# Patient Record
Sex: MALE | Race: WHITE | Employment: OTHER | URBAN - METROPOLITAN AREA
[De-identification: names, ages, dates, MRNs, and addresses within clinical notes are randomized per-mention and may not be internally consistent; named-entity substitution may affect disease eponyms.]

---

## 2021-03-30 ENCOUNTER — HOSPITAL ENCOUNTER (OUTPATIENT)
Age: 66
Setting detail: OUTPATIENT SURGERY
Discharge: HOME | End: 2021-03-30
Attending: UROLOGY | Admitting: UROLOGY
Payer: MEDICAID

## 2021-03-30 VITALS
OXYGEN SATURATION: 100 % | SYSTOLIC BLOOD PRESSURE: 164 MMHG | WEIGHT: 259 LBS | RESPIRATION RATE: 18 BRPM | HEART RATE: 60 BPM | HEIGHT: 73 IN | BODY MASS INDEX: 34.33 KG/M2 | TEMPERATURE: 98 F | DIASTOLIC BLOOD PRESSURE: 94 MMHG

## 2021-03-30 PROCEDURE — 6370000000 HC RX 637 (ALT 250 FOR IP): Performed by: UROLOGY

## 2021-03-30 PROCEDURE — 2709999900 HC NON-CHARGEABLE SUPPLY: Performed by: UROLOGY

## 2021-03-30 PROCEDURE — 7100000010 HC PHASE II RECOVERY - FIRST 15 MIN: Performed by: UROLOGY

## 2021-03-30 PROCEDURE — 3600000012 HC SURGERY LEVEL 2 ADDTL 15MIN: Performed by: UROLOGY

## 2021-03-30 PROCEDURE — 3600000002 HC SURGERY LEVEL 2 BASE: Performed by: UROLOGY

## 2021-03-30 RX ORDER — METOPROLOL SUCCINATE 100 MG/1
1 TABLET, EXTENDED RELEASE ORAL DAILY
COMMUNITY
Start: 2021-03-10

## 2021-03-30 RX ORDER — TAMSULOSIN HYDROCHLORIDE 0.4 MG/1
2 CAPSULE ORAL NIGHTLY
COMMUNITY
Start: 2021-03-10

## 2021-03-30 RX ORDER — LEVOFLOXACIN 500 MG/1
500 TABLET, FILM COATED ORAL ONCE
Status: COMPLETED | OUTPATIENT
Start: 2021-03-30 | End: 2021-03-30

## 2021-03-30 RX ORDER — WARFARIN SODIUM 6 MG/1
9 TABLET ORAL
Status: ON HOLD | COMMUNITY
End: 2021-05-05 | Stop reason: HOSPADM

## 2021-03-30 RX ORDER — LEVOFLOXACIN 500 MG/1
500 TABLET, FILM COATED ORAL ONCE
Qty: 1 TABLET | Refills: 0 | Status: SHIPPED | OUTPATIENT
Start: 2021-03-30 | End: 2021-03-30

## 2021-03-30 RX ORDER — LISINOPRIL 40 MG/1
20 TABLET ORAL 2 TIMES DAILY
COMMUNITY
Start: 2021-03-12

## 2021-03-30 RX ORDER — LIDOCAINE HYDROCHLORIDE 20 MG/ML
JELLY TOPICAL PRN
Status: DISCONTINUED | OUTPATIENT
Start: 2021-03-30 | End: 2021-03-30 | Stop reason: ALTCHOICE

## 2021-03-30 RX ADMIN — LEVOFLOXACIN 500 MG: 500 TABLET, FILM COATED ORAL at 15:41

## 2021-03-30 ASSESSMENT — PAIN - FUNCTIONAL ASSESSMENT: PAIN_FUNCTIONAL_ASSESSMENT: 0-10

## 2021-03-30 NOTE — H&P
History and Physical    Patient:  Lynsey Renee  MRN: [id number]    CHIEF COMPLAINT:  BPH    HISTORY OF PRESENT ILLNESS:   The patient is a 63 y.o. male who presents with BPH. Patient's old records, notes and chart reviewed and summarized above. Past Medical History:    Past Medical History:   Diagnosis Date    BPH (benign prostatic hyperplasia)     Hypertension     Mitral valve prolapse     TIA (transient ischemic attack)        Past Surgical History:    Past Surgical History:   Procedure Laterality Date    CARDIAC SURGERY      INSERTABLE CARDIAC MONITOR      MITRAL VALVE REPAIR      minimally invasive procedure    PROSTATE BIOPSY      X2, found tiny spot of cancer on biopsies    VASECTOMY         Medications Prior to Admission:    Prior to Admission medications    Medication Sig Start Date End Date Taking? Authorizing Provider   warfarin (COUMADIN) 6 MG tablet Take 9 mg by mouth three times a week Takes (1.5) 6 mg tablets = 9 mg  On Tuesday, Thursday, Saturday   Yes Historical Provider, MD   metoprolol succinate (TOPROL XL) 100 MG extended release tablet Take 1 tablet by mouth daily 3/10/21  Yes Historical Provider, MD   lisinopril (PRINIVIL;ZESTRIL) 40 MG tablet Take 20 mg by mouth 2 times daily Takes 1/2 of a 40 mg tablet 3/12/21  Yes Historical Provider, MD   tamsulosin (FLOMAX) 0.4 MG capsule Take 2 capsules by mouth nightly 3/10/21  Yes Historical Provider, MD   SPIRONOLACTONE PO Take 1 tablet by mouth daily   Yes Historical Provider, MD   warfarin (COUMADIN) 6 MG tablet Take 6 mg by mouth four times a week On Sunday, Monday, Wednesday, Friday   Yes Historical Provider, MD       Allergies:  Patient has no known allergies.     Social History:    Social History     Socioeconomic History    Marital status:      Spouse name: Not on file    Number of children: Not on file    Years of education: Not on file    Highest education level: Not on file   Occupational History    Not on file Social Needs    Financial resource strain: Not on file    Food insecurity     Worry: Not on file     Inability: Not on file    Transportation needs     Medical: Not on file     Non-medical: Not on file   Tobacco Use    Smoking status: Current Some Day Smoker     Types: Cigars    Smokeless tobacco: Never Used   Substance and Sexual Activity    Alcohol use: Yes     Comment: occasional beer    Drug use: No    Sexual activity: Not on file   Lifestyle    Physical activity     Days per week: Not on file     Minutes per session: Not on file    Stress: Not on file   Relationships    Social connections     Talks on phone: Not on file     Gets together: Not on file     Attends Anglican service: Not on file     Active member of club or organization: Not on file     Attends meetings of clubs or organizations: Not on file     Relationship status: Not on file    Intimate partner violence     Fear of current or ex partner: Not on file     Emotionally abused: Not on file     Physically abused: Not on file     Forced sexual activity: Not on file   Other Topics Concern    Not on file   Social History Narrative    Not on file       Family History:  History reviewed. No pertinent family history. REVIEW OF SYSTEMS:  All systems reviewed and negative except for that already noted in the HPI. Physical Exam:      Patient Vitals for the past 24 hrs:   BP Temp Temp src Pulse Resp SpO2 Height Weight   03/30/21 1401 (!) 151/84 96.6 °F (35.9 °C) Skin 72 20 97 % 6' 1\" (1.854 m) 259 lb (117.5 kg)     Constitutional: Patient in no acute distress; Neuro: alert and oriented to person place and time. Psych: Mood and affect normal.  Skin: Normal  Lungs: Respiratory effort normal  Cardiovascular:  Normal peripheral pulses  Abdomen: Soft, non-tender, non-distended with no CVA, flank pain, hepatosplenomegaly or hernia. Kidneys normal.  Bladder non-tender and not distended.   Lymphatics: no palpable lymphadenopathy  Penis normal and circumcised  Urethral meatus normal  Scrotal exam normal  Testicles normal bilaterally  Epididymis normal bilaterally  No evidence of inguinal hernia  Anus and perineum normal  Normal rectal tone with no masses  Prostate soft, non-tender to palpation. No palpable nodules. Estimated 40 grams. Seminal vesicles not palpable. LABS:   No results for input(s): WBC, HGB, HCT, MCV, PLT in the last 72 hours. No results for input(s): NA, K, CL, CO2, PHOS, BUN, CREATININE in the last 72 hours. Invalid input(s): CA  No results found for: PSA    Additional Lab/culture results:    Urinalysis: No results for input(s): COLORU, PHUR, LABCAST, WBCUA, RBCUA, MUCUS, TRICHOMONAS, YEAST, BACTERIA, CLARITYU, SPECGRAV, LEUKOCYTESUR, UROBILINOGEN, [place] in the last 72 hours. Invalid input(s): NITRATE, GLUCOSEUKETONESUAMORPHOUS     -----------------------------------------------------------------  Imaging Results:    Assessment and Plan   Impression:  There is no problem list on file for this patient. Plan: Cystoscopy/uroflow to evaluate for UroLift.     Kenney Salinas  3:05 PM 3/30/2021

## 2021-03-30 NOTE — H&P
History and Physical Update    Pt Name: Marcelo Vences  MRN: [id number]  YOB: 1955  Date of evaluation: 3/30/2021      [x] I have reviewed the hardcopy Progress Note by Dr Renee Orozco dated 3/3/21 labeled in short chart which meets the criteria for an Interval History and Physical note. [x] I have examined  Thomas Pym  There are no changes to the patient who is scheduled for a Local cystoscopy with uroflow by Dr Renee Orozco for BPH with obstruction    The patient denies new health changes, fever, chills, wheezing, cough, increased SOB, chest pain, open sores or wounds. Continued Coumadin as directed     Past Medical History:     Past Medical History:   Diagnosis Date    BPH (benign prostatic hyperplasia)     Hypertension     Mitral valve prolapse     TIA (transient ischemic attack)         Past Surgical History:     Past Surgical History:   Procedure Laterality Date    CARDIAC SURGERY      INSERTABLE CARDIAC MONITOR      MITRAL VALVE REPAIR      minimally invasive procedure    PROSTATE BIOPSY      X2, found tiny spot of cancer on biopsies    VASECTOMY          Social History:     Tobacco:    reports that he has been smoking cigars. He has never used smokeless tobacco.  Alcohol:      reports current alcohol use. Drug Use:  reports no history of drug use. Family History:     History reviewed. No pertinent family history. Medication History:     Prior to Admission medications    Medication Sig Start Date End Date Taking?  Authorizing Provider   warfarin (COUMADIN) 6 MG tablet Take 9 mg by mouth three times a week Takes (1.5) 6 mg tablets = 9 mg  On Tuesday, Thursday, Saturday   Yes Historical Provider, MD   metoprolol succinate (TOPROL XL) 100 MG extended release tablet Take 1 tablet by mouth daily 3/10/21  Yes Historical Provider, MD   lisinopril (PRINIVIL;ZESTRIL) 40 MG tablet Take 20 mg by mouth 2 times daily Takes 1/2 of a 40 mg tablet 3/12/21  Yes Historical Provider, MD   tamsulosin [facility]) 0.4 MG capsule Take 2 capsules by mouth nightly 3/10/21  Yes Historical Provider, MD   SPIRONOLACTONE PO Take 1 tablet by mouth daily   Yes Historical Provider, MD   warfarin (COUMADIN) 6 MG tablet Take 6 mg by mouth four times a week On Sunday, Monday, Wednesday, Friday   Yes Historical Provider, MD         Vital signs: BP (!) 151/84   Pulse 72   Temp 96.6 °F (35.9 °C) (Skin)   Resp 20   Ht 6' 1\" (1.854 m)   Wt 259 lb (117.5 kg)   SpO2 97%   BMI 34.17 kg/m²     Allergies:  Patient has no known allergies. This is a 63 y.o. male who is pleasant, cooperative, alert and oriented x3, in no acute distress. Heart: Heart sounds are normal.  HR 72 regular rate and rhythm without murmur, gallop or rub. Lungs: Normal respiratory effort with equal expansion, good air exchange, unlabored and clear to auscultation without wheezes or rales bilaterally   No CVA tenderness. Abdomen: soft, nontender, nondistended with bowel sounds . Labs:  No results for input(s): HGB, HCT, WBC, MCV, PLT, NA, K, CL, CO2, BUN, CREATININE, GLUCOSE, INR, PROTIME, APTT, AST, ALT, LABALBU, HCG in the last 720 hours. No results for input(s): COVID19 in the last 720 hours.     Sharyle Britain APRN, ANP-BC  Electronically signed 3/30/2021 at 3:12 PM

## 2021-03-31 NOTE — OP NOTE
[id number] [facility] [address]                [address] [place] [address]                                OPERATIVE REPORT    PATIENT NAME: JAKE SAUER                        :        1955  MED REC NO:   [id number]                             ROOM:  ACCOUNT NO:   [de-identified]                           ADMIT DATE: 2021  PROVIDER:     Tasia Drummond    DATE OF PROCEDURE:  2021    PREOPERATIVE DIAGNOSIS:  BPH with obstruction. POSTOPERATIVE DIAGNOSIS:  BPH with obstruction. PROCEDURES PERFORMED:  1. Cystoscopy. 2.  Uroflow. SURGEON:  Adan Hunt MD    ESTIMATED BLOOD LOSS:  None. ANESTHESIA:  Local.    INDICATIONS:  The patient is a 62-year-old male. He has BPH with  obstruction. He also has prostate cancer under active surveillance. He  takes 2 capsules of Flomax daily. He still has obstructive symptoms. His prostate volume is approximately 81 g. He presents for the above  procedure. PROCEDURE IN DETAIL:  He was taken to the operating room. He did a  uroflow study. He voided 167.2 mL. The max flow rate was only 5.5  mL/sec. He was put in the supine position. His genitals were prepped  in usual sterile fashion. Lidocaine jelly 2% was injected into the  urethra. 14-Bengali catheter was placed with 125 mL of residual urine  noted. Next, flexible cystoscopy was performed. Findings revealed no  strictures. The prostatic urethra was answered, which showed a 3.5 cm  prostate with visual occlusion. He did not have any median lobe  protrusion. The bladder itself had mild trabeculations. No bladder  cancer was noted. The scope was retroflexed with no median lobe  protrusion noted. He tolerated it well. Plan will be to schedule him  for the UroLift procedure. We will get clearance from Cardiology to  hold his Coumadin. All of his questions were answered.         Kim Orozco    D: 2021 15:51:45       T: 03/30/2021 15:57:56     [email or web address]  Job#: [id number]     Doc#: [id number]    CC:

## 2021-05-01 ENCOUNTER — HOSPITAL ENCOUNTER (OUTPATIENT)
Dept: LAB | Age: 66
Setting detail: SPECIMEN
Discharge: HOME | End: 2021-05-01
Payer: MEDICAID

## 2021-05-01 DIAGNOSIS — Z01.818 PREOP TESTING: Primary | ICD-10-CM

## 2021-05-01 LAB
SARS-COV-2: NORMAL
SARS-COV-2: NOT DETECTED
SOURCE: NORMAL

## 2021-05-01 PROCEDURE — U0005 INFEC AGEN DETEC AMPLI PROBE: HCPCS

## 2021-05-01 PROCEDURE — U0003 INFECTIOUS AGENT DETECTION BY NUCLEIC ACID (DNA OR RNA); SEVERE ACUTE RESPIRATORY SYNDROME CORONAVIRUS 2 (SARS-COV-2) (CORONAVIRUS DISEASE [COVID-19]), AMPLIFIED PROBE TECHNIQUE, MAKING USE OF HIGH THROUGHPUT TECHNOLOGIES AS DESCRIBED BY CMS-2020-01-R: HCPCS

## 2021-05-05 ENCOUNTER — HOSPITAL ENCOUNTER (OUTPATIENT)
Age: 66
Setting detail: OUTPATIENT SURGERY
Discharge: HOME | End: 2021-05-05
Attending: UROLOGY | Admitting: UROLOGY
Payer: MEDICAID

## 2021-05-05 ENCOUNTER — ANESTHESIA EVENT (OUTPATIENT)
Dept: OPERATING ROOM | Age: 66
End: 2021-05-05
Payer: MEDICAID

## 2021-05-05 ENCOUNTER — ANESTHESIA (OUTPATIENT)
Dept: OPERATING ROOM | Age: 66
End: 2021-05-05
Payer: MEDICAID

## 2021-05-05 VITALS
TEMPERATURE: 97.2 F | HEIGHT: 73 IN | BODY MASS INDEX: 35.08 KG/M2 | DIASTOLIC BLOOD PRESSURE: 100 MMHG | SYSTOLIC BLOOD PRESSURE: 169 MMHG | WEIGHT: 264.7 LBS | OXYGEN SATURATION: 96 % | HEART RATE: 60 BPM | RESPIRATION RATE: 18 BRPM

## 2021-05-05 VITALS — OXYGEN SATURATION: 98 % | SYSTOLIC BLOOD PRESSURE: 115 MMHG | DIASTOLIC BLOOD PRESSURE: 68 MMHG

## 2021-05-05 LAB
INR BLD: 1.1
PROTHROMBIN TIME: 13.6 SEC (ref 11.5–14.2)

## 2021-05-05 PROCEDURE — 6370000000 HC RX 637 (ALT 250 FOR IP): Performed by: UROLOGY

## 2021-05-05 PROCEDURE — 3600000012 HC SURGERY LEVEL 2 ADDTL 15MIN: Performed by: UROLOGY

## 2021-05-05 PROCEDURE — 6370000000 HC RX 637 (ALT 250 FOR IP): Performed by: ANESTHESIOLOGY

## 2021-05-05 PROCEDURE — 2500000003 HC RX 250 WO HCPCS: Performed by: NURSE ANESTHETIST, CERTIFIED REGISTERED

## 2021-05-05 PROCEDURE — 7100000001 HC PACU RECOVERY - ADDTL 15 MIN: Performed by: UROLOGY

## 2021-05-05 PROCEDURE — 3600000002 HC SURGERY LEVEL 2 BASE: Performed by: UROLOGY

## 2021-05-05 PROCEDURE — 3700000001 HC ADD 15 MINUTES (ANESTHESIA): Performed by: UROLOGY

## 2021-05-05 PROCEDURE — 2580000003 HC RX 258: Performed by: ANESTHESIOLOGY

## 2021-05-05 PROCEDURE — 2709999900 HC NON-CHARGEABLE SUPPLY: Performed by: UROLOGY

## 2021-05-05 PROCEDURE — 2580000003 HC RX 258: Performed by: UROLOGY

## 2021-05-05 PROCEDURE — 7100000000 HC PACU RECOVERY - FIRST 15 MIN: Performed by: UROLOGY

## 2021-05-05 PROCEDURE — 3700000000 HC ANESTHESIA ATTENDED CARE: Performed by: UROLOGY

## 2021-05-05 PROCEDURE — 85610 PROTHROMBIN TIME: CPT

## 2021-05-05 PROCEDURE — 7100000011 HC PHASE II RECOVERY - ADDTL 15 MIN: Performed by: UROLOGY

## 2021-05-05 PROCEDURE — 6360000002 HC RX W HCPCS: Performed by: NURSE ANESTHETIST, CERTIFIED REGISTERED

## 2021-05-05 PROCEDURE — 6360000002 HC RX W HCPCS: Performed by: UROLOGY

## 2021-05-05 PROCEDURE — 7100000010 HC PHASE II RECOVERY - FIRST 15 MIN: Performed by: UROLOGY

## 2021-05-05 PROCEDURE — C1889 IMPLANT/INSERT DEVICE, NOC: HCPCS

## 2021-05-05 DEVICE — SYSTEM URO W/ IMPL DEL DEV FOR TREAT OF URIN OUTFLO: Type: IMPLANTABLE DEVICE | Site: URETHRA | Status: FUNCTIONAL

## 2021-05-05 RX ORDER — FENTANYL CITRATE 50 UG/ML
INJECTION, SOLUTION INTRAMUSCULAR; INTRAVENOUS PRN
Status: DISCONTINUED | OUTPATIENT
Start: 2021-05-05 | End: 2021-05-05 | Stop reason: SDUPTHER

## 2021-05-05 RX ORDER — ONDANSETRON 2 MG/ML
4 INJECTION INTRAMUSCULAR; INTRAVENOUS
Status: DISCONTINUED | OUTPATIENT
Start: 2021-05-05 | End: 2021-05-05 | Stop reason: HOSPADM

## 2021-05-05 RX ORDER — PROPOFOL 10 MG/ML
INJECTION, EMULSION INTRAVENOUS CONTINUOUS PRN
Status: DISCONTINUED | OUTPATIENT
Start: 2021-05-05 | End: 2021-05-05 | Stop reason: SDUPTHER

## 2021-05-05 RX ORDER — MIDAZOLAM HYDROCHLORIDE 1 MG/ML
INJECTION INTRAMUSCULAR; INTRAVENOUS PRN
Status: DISCONTINUED | OUTPATIENT
Start: 2021-05-05 | End: 2021-05-05 | Stop reason: SDUPTHER

## 2021-05-05 RX ORDER — HYDRALAZINE HYDROCHLORIDE 20 MG/ML
5 INJECTION INTRAMUSCULAR; INTRAVENOUS EVERY 10 MIN PRN
Status: DISCONTINUED | OUTPATIENT
Start: 2021-05-05 | End: 2021-05-05 | Stop reason: HOSPADM

## 2021-05-05 RX ORDER — LIDOCAINE HYDROCHLORIDE 10 MG/ML
1 INJECTION, SOLUTION EPIDURAL; INFILTRATION; INTRACAUDAL; PERINEURAL
Status: DISCONTINUED | OUTPATIENT
Start: 2021-05-05 | End: 2021-05-05 | Stop reason: HOSPADM

## 2021-05-05 RX ORDER — PROPOFOL 10 MG/ML
INJECTION, EMULSION INTRAVENOUS PRN
Status: DISCONTINUED | OUTPATIENT
Start: 2021-05-05 | End: 2021-05-05 | Stop reason: SDUPTHER

## 2021-05-05 RX ORDER — LIDOCAINE HYDROCHLORIDE 20 MG/ML
JELLY TOPICAL PRN
Status: DISCONTINUED | OUTPATIENT
Start: 2021-05-05 | End: 2021-05-05 | Stop reason: ALTCHOICE

## 2021-05-05 RX ORDER — LIDOCAINE HYDROCHLORIDE 10 MG/ML
1 INJECTION, SOLUTION EPIDURAL; INFILTRATION; INTRACAUDAL; PERINEURAL
Status: DISCONTINUED | OUTPATIENT
Start: 2021-05-06 | End: 2021-05-05

## 2021-05-05 RX ORDER — OXYCODONE HYDROCHLORIDE AND ACETAMINOPHEN 5; 325 MG/1; MG/1
1 TABLET ORAL
Status: COMPLETED | OUTPATIENT
Start: 2021-05-05 | End: 2021-05-05

## 2021-05-05 RX ORDER — LIDOCAINE HYDROCHLORIDE 20 MG/ML
INJECTION, SOLUTION INFILTRATION; PERINEURAL PRN
Status: DISCONTINUED | OUTPATIENT
Start: 2021-05-05 | End: 2021-05-05 | Stop reason: SDUPTHER

## 2021-05-05 RX ORDER — LABETALOL HYDROCHLORIDE 5 MG/ML
5 INJECTION, SOLUTION INTRAVENOUS EVERY 10 MIN PRN
Status: DISCONTINUED | OUTPATIENT
Start: 2021-05-05 | End: 2021-05-05 | Stop reason: HOSPADM

## 2021-05-05 RX ORDER — MEPERIDINE HYDROCHLORIDE 50 MG/ML
12.5 INJECTION INTRAMUSCULAR; INTRAVENOUS; SUBCUTANEOUS EVERY 5 MIN PRN
Status: DISCONTINUED | OUTPATIENT
Start: 2021-05-05 | End: 2021-05-05 | Stop reason: HOSPADM

## 2021-05-05 RX ORDER — HYDROMORPHONE HYDROCHLORIDE 1 MG/ML
0.5 INJECTION, SOLUTION INTRAMUSCULAR; INTRAVENOUS; SUBCUTANEOUS EVERY 5 MIN PRN
Status: DISCONTINUED | OUTPATIENT
Start: 2021-05-05 | End: 2021-05-05 | Stop reason: HOSPADM

## 2021-05-05 RX ORDER — SODIUM CHLORIDE, SODIUM LACTATE, POTASSIUM CHLORIDE, CALCIUM CHLORIDE 600; 310; 30; 20 MG/100ML; MG/100ML; MG/100ML; MG/100ML
INJECTION, SOLUTION INTRAVENOUS CONTINUOUS
Status: DISCONTINUED | OUTPATIENT
Start: 2021-05-05 | End: 2021-05-05 | Stop reason: HOSPADM

## 2021-05-05 RX ADMIN — PROPOFOL 150 MCG/KG/MIN: 10 INJECTION, EMULSION INTRAVENOUS at 14:39

## 2021-05-05 RX ADMIN — PROPOFOL 50 MG: 10 INJECTION, EMULSION INTRAVENOUS at 14:39

## 2021-05-05 RX ADMIN — SODIUM CHLORIDE, POTASSIUM CHLORIDE, SODIUM LACTATE AND CALCIUM CHLORIDE: 600; 310; 30; 20 INJECTION, SOLUTION INTRAVENOUS at 11:27

## 2021-05-05 RX ADMIN — CEFAZOLIN SODIUM 3000 MG: 10 INJECTION, POWDER, FOR SOLUTION INTRAVENOUS at 14:41

## 2021-05-05 RX ADMIN — Medication 25 MCG: at 14:45

## 2021-05-05 RX ADMIN — LIDOCAINE HYDROCHLORIDE 100 MG: 20 INJECTION, SOLUTION INFILTRATION; PERINEURAL at 14:39

## 2021-05-05 RX ADMIN — OXYCODONE HYDROCHLORIDE AND ACETAMINOPHEN 1 TABLET: 5; 325 TABLET ORAL at 15:49

## 2021-05-05 RX ADMIN — MIDAZOLAM 2 MG: 1 INJECTION INTRAMUSCULAR; INTRAVENOUS at 14:39

## 2021-05-05 RX ADMIN — Medication 25 MCG: at 14:42

## 2021-05-05 RX ADMIN — SODIUM CHLORIDE, POTASSIUM CHLORIDE, SODIUM LACTATE AND CALCIUM CHLORIDE: 600; 310; 30; 20 INJECTION, SOLUTION INTRAVENOUS at 14:32

## 2021-05-05 ASSESSMENT — PULMONARY FUNCTION TESTS
PIF_VALUE: 1
PIF_VALUE: 1
PIF_VALUE: 0
PIF_VALUE: 0
PIF_VALUE: 1

## 2021-05-05 ASSESSMENT — LIFESTYLE VARIABLES: SMOKING_STATUS: 1

## 2021-05-05 ASSESSMENT — PAIN SCALES - GENERAL
PAINLEVEL_OUTOF10: 2
PAINLEVEL_OUTOF10: 4

## 2021-05-05 NOTE — OP NOTE
Operative Note      Patient: Bettie Curtis  YOB: 1955  MRN: [id number]    Date of Procedure: 5/5/2021    Pre-Op Diagnosis: DX BPH    Post-Op Diagnosis: Same       Procedure(s):  CYSTOSCOPY PROSTATIC UROLIFT x 6    Surgeon(s):  Farheen Travis MD    Assistant:   * No surgical staff found *    Anesthesia: Monitor Anesthesia Care    Estimated Blood Loss (mL): Minimal    Complications: None    Specimens:   * No specimens in log *    Implants:  Implant Name Type Inv. Item Serial No.  Lot No. LRB No. Used Action   SYSTEM URO W/ IMPL DEL DEV FOR TREAT OF URIN OUTFLO  SYSTEM URO W/ IMPL DEL DEV FOR TREAT OF [place]  [employer] [id number] N/A 6 Implanted         Drains:   Urethral Catheter Non-latex;Straight-tip 18 fr (Active)   $ Urethral catheter insertion Inserted for procedure 05/05/21 1505   Catheter Indications Perioperative use for selected surgical procedures 05/05/21 1505   Site Assessment No urethral drainage 05/05/21 1505   Urine Color Pink 05/05/21 1505   Urine Appearance Clear 05/05/21 1505       Findings: BPH    Detailed Description of Procedure:   Joan Gardiner was taken to the OR and given MAC anesthesia. His genitals were prepped in usual sterile fashion and 2% lidocaine gel was placed in the urethra. 20F 30 degree cystoscope was placed in the bladder. The prostate was imaged. It was 3.5 to 4cm long. 2 implants were placed 1.5cm proximal to the bladder neck. 2 more were placed at the veromontanum. 2 more placed at the mid prostate. Bone strikes occurred on 2 implants that did not deploy. The anterior channel was widely patent. An 18F dominguez was placed. He tolerated it well.   He will follow up Tuesday for a void trial.    Electronically signed by Farheen Travis MD on 5/5/2021 at 3:25 PM

## 2021-05-05 NOTE — H&P
History and Physical Update    Pt Name: Chloe Prakash  MRN: [id number]  YOB: 1955  Date of evaluation: 5/5/2021      [x] I have reviewed the Progress Note by Eliazar Gandhi CNP dated 4/20/21 in epic which meets the criteria for an Interval History and Physical note which is attached below. [x] I have examined  Thomas Vic  There are no changes to the patient who is scheduled for a cystoscopy prostatic urethral lift by Dr. Saundra Nina for BPH. The patient denies new health changes, fever, chills, wheezing, cough, open sores or wounds. Extensive cardiac hx A fib, TIA,  HTN, mitral valve prolapse, MV repair (2007), paroxysmal v tach, tricuspid and pulmonary valve disorder, CHF, angina, atherosclerosis, loop recorder. Denies any recent episodes of atrial fib with palpitations. Patient follows Dr. Jamari Beavers with cardiology. Last seen for cardiac clearance on 4/20/21 considered \"low risk from cardiac standpoint for upcoming Urolift. \" per note attached below. Patient denies chest pain, increased SOB, dizziness, palpitations. Last coumadin dose 4/30/21. Follows at [facility] anticoagulant clinic    Vital signs: BP (!) 169/96   Pulse 64   Temp 97.5 °F (36.4 °C) (Temporal)   Resp 20   Ht 6' 1\" (1.854 m)   Wt 264 lb 11.2 oz (120.1 kg)   SpO2 96%   BMI 34.92 kg/m²     Allergies:  Patient has no known allergies. Medications:    Prior to Admission medications    Medication Sig Start Date End Date Taking?  Authorizing Provider   metoprolol succinate (TOPROL XL) 100 MG extended release tablet Take 1 tablet by mouth daily 3/10/21  Yes Historical Provider, MD   lisinopril (PRINIVIL;ZESTRIL) 40 MG tablet Take 20 mg by mouth 2 times daily Takes 1/2 of a 40 mg tablet 3/12/21  Yes Historical Provider, MD   tamsulosin (FLOMAX) 0.4 MG capsule Take 2 capsules by mouth nightly 3/10/21  Yes Historical Provider, MD   SPIRONOLACTONE PO Take 1 tablet by mouth daily   Yes Historical Provider, MD   warfarin (COUMADIN) 6 congestive heart failure (CMS-HCC)    Abnormal cardiovascular stress test    Cardiac angina (CMS-HCC)    Atherosclerosis of native coronary artery of native heart without angina pectoris    Presence of other cardiac implants and grafts     _______________________  Allergies   Allergen Reactions    No Known Drug Allergies     _______________________  Current Outpatient Medications   Medication Sig Dispense Refill    lisinopriL (PRINIVIL,ZESTRIL) 40 mg tablet TAKE 1/2 TABLET BY MOUTH TWO TIMES A DAY 90 tablet 1    metoprolol succinate XL (TOPROL-XL) 100 mg 24 hr tablet TAKE ONE TABLET BY MOUTH DAILY 30 tablet 10    spironolactone (ALDACTONE) 25 mg tablet TAKE ONE TABLET BY MOUTH DAILY 30 tablet 2    tamsulosin (FLOMAX) 0.4 mg capsule Take 0.8 mg by mouth nightly.  warfarin (COUMADIN) 6 mg tablet Take 1-1.5 tablets (6-9 mg total) by mouth daily. As directed by [facility] Medication Therapy Management (MTM) 45 tablet 6     No current facility-administered medications for this visit.     _______________________  Chief Complaint   Patient presents with    Pre-op Exam   pt having Urolift on 5/5 by  under MAC Anesthesia @    Atrial Fibrillation    Follow-up     _______________________  History of Present Illness  Patient seen for preop eval prior to Urolift. Last office visit with Dr. Emily Adams 08/2020 for preop eval prior to prostate biopsy, patient considered low risk, okay to hold warfarin x5 days. Patient had no cardiac complaints at that time, no med changes made. Today patient states he has been doing well. Denies anginal chest pain, dyspnea, lightheadedness, dizziness, syncope, near syncope, palpitations.   _______________________  Past Medical History:   Diagnosis Date    Abnormal ECG    Anxiety   no medications    Arrhythmia    Benign essential hypertension    Cardiac angina (CMS-HCC) 1/10/2020    CHF (congestive heart failure) (CMS-HCC)    Colon polyp 08/2018   x 1 removed with colonoscopy    Diabetes mellitus (CMS-[facility])   borderline no medications    Dizziness   past not at present    Elevated PSA 2019    Fatigue    Hypercholesteremia   no medications at present    Long term current use of anticoagulant therapy   Coumadin    Mitral valve prolapse   repaired     Nonrheumatic mitral (valve) insufficiency    Paroxysmal atrial fibrillation (CMS-[facility])    Smoker within last 12 months   occassional cigar    Status post placement of implantable loop recorder 2018    Stroke (CMS-HCC)   tia (3)    Syncope   no episodes in the past couple years    TIA (transient ischemic attack)   x 3 first one 2011    Tingling of upper extremity 2018   was admiited for workup for tingling in arm     No data recorded  No data recorded  No data recorded  _______________________  Past Surgical History:   Procedure Laterality Date    CARDIAC CATHETERIZATION 2007    Cardiac catheterization 2020   Performed by Jignesh Page MD at [facility] w polypectomy N/A 2018   Performed by Nilda Clark MD at [place] [address] Coronary angiogram and left ventricular gram/pressure N/A 2020   Performed by Jignesh Page MD at [facility]    EPS +/- ICD, +/- ILR - MDT N/A 2018   Performed by Saima Almazan MD at [facility] (EP)    INSERTION LOOP RECORDER 2018    MITRAL VALVE REPAIR 09/10/2007    ULTRASOUND GUIDED BIOPSY PROSTATE N/A 2020   Performed by Ortiz Perez MD at [address] [place] [address] N/A 2019   Performed by Ortiz Perez MD at [facility]     _______________________  Family History   Problem Relation Age of Onset    Arthritis Mother    Hypertension Mother    Coronary artery disease Father    at 80    Heart attack Father    Heart disease Father    Prostate cancer Father   age 66    Bone cancer Father    Lung cancer Father    Valvular heart disease Brother    Heart attack Paternal Grandfather    Transient ischemic attack Sister    Bleeding Disorder Neg Hx    Clotting disorder Neg Hx    Anesthesia problems Neg Hx    Colon cancer Neg Hx    Diabetes Neg Hx     _______________________  Social History     Socioeconomic History    Marital status:    Spouse name: Not on file    Number of children: Not on file    Years of education: Not on file    Highest education level: Not on file   Occupational History    Not on file   Tobacco Use    Smoking status: Light Tobacco Smoker   Types: Cigars    Smokeless tobacco: Never Used    Tobacco comment: occasional cigar   Substance and Sexual Activity    Alcohol use: Yes   Comment: socially    Drug use: No    Sexual activity: Defer   Comment: not addressed   Other Topics Concern    Caffeine Use Yes   Social History Narrative   Lives alone; girlfriend will help after surgery     Social Determinants of Health     Financial Resource Strain:    Difficulty of Paying Living Expenses:   Transportation Needs:    Lack of Transportation (Medical):  Lack of Transportation (Non-Medical):   Physical Activity:    Days of Exercise per Week:    Minutes of Exercise per Session:   Stress:    Feeling of Stress :   Social Connections:    Frequency of Communication with Friends and Family:    Frequency of Social Gatherings with Friends and Family:    Attends Protestant Services:    Active Member of Clubs or Organizations:    Attends Club or Organization Meetings:    Marital Status:   Intimate Partner Violence:    Fear of Current or Ex-Partner:    Emotionally Abused:    Physically Abused:    Sexually Abused:     _______________________  Review of Systems  Review of Systems   Constitutional: Negative for chills, fever, malaise/fatigue, night sweats, weight gain and weight loss. HENT: Negative for hearing loss, hoarse voice and nosebleeds.    Eyes: Negative for blurred vision and double vision. Respiratory: Negative for cough, shortness of breath, sleep disturbances due to breathing and wheezing. Endocrine: Negative for cold intolerance and heat intolerance. Hematologic/Lymphatic: Negative for bleeding problem. Does not bruise/bleed easily. Skin: Negative for color change, flushing, itching and rash. Musculoskeletal: Negative for falls, joint pain, joint swelling, muscle cramps, muscle weakness and myalgias. Gastrointestinal: Negative for bloating, abdominal pain, constipation, diarrhea, heartburn, hematemesis, hematochezia, melena, nausea and vomiting. Neurological: Negative for dizziness, headaches, light-headedness, loss of balance, numbness, seizures, tremors and vertigo. Psychiatric/Behavioral: Negative for altered mental status, depression and memory loss. The patient does not have insomnia and is not nervous/anxious. CARDIOVASCULAR: Please review HPI.  _______________________  Physical Examination  General appearance: Alert, oriented and cooperative. In no acute distress. Skin: Warm and dry to touch. Head: Normocephalic, without obvious abnormality, atraumatic. Neck: No JVD  Respiratory: Clear to auscultation bilaterally, no use of accessory muscles. Cardiovascular: RRR with normal S1 and S2 with no murmurs. Gastrointestinal: Soft  Musculoskeletal: No peripheral edema. Neurologic: Oriented to time, person and place, affect appropriate. No focal/major motor defects noted. Psychiatric: Appropriate mood, memory and judgement.  _______________________  VITAL SIGNS:  /70  Pulse 61  Ht 185.4 cm (6' 1\")  Wt 121.6 kg (268 lb)  SpO2 96%  BMI 35.36 kg/m²   _______________________  No orders of the defined types were placed in this encounter. There are no discontinued medications. ______________  _________  IMPRESSIONS/PLAN  1. Preop cardiovascular exam  - POCT EKG    2.  Atherosclerosis of native coronary artery of native heart without angina pectoris  - POCT EKG    3. Paroxysmal atrial fibrillation (CMS-HCC)  - POCT EKG    4. Benign essential hypertension  - POCT EKG    5. Paroxysmal ventricular tachycardia (CMS-HCC)  - POCT EKG    6. Chronic systolic congestive heart failure (CMS-HCC)    7. Nonrheumatic mitral valve insufficiency    8. Presence of other cardiac implants and grafts    Native vessel ASCVD w/o angina - [facility] 01/17/2020 w/ mLAD 30%, LCX min luminal irregs    NICMP, improved EF 50-55% on TTE 12/2020    Paroxysmal AFib - no recurrence on LINQ since placed 07/2018  - [id number] score 4 (HTN, CHF, TIAs-2) - warfarin    H/O nonrheumatic severe MR w/ MVP s/p MV repair 2007 - normal gradient/fxn on TTE 12/2020    Essential HTN    H/O syncope s/p LINQ - no recurrence    H/O PVCs    H/O TIAs     Tobacco abuse - occasional cigar smoking - smoking cessation encouraged    Patient considered low risk from cardiac standpoint for upcoming Urolift. Recommend hold warfarin no more than 5 days prior to procedure, resume as soon as safely able post procedure no bridging required. No changes to medication or testing ordered today. Otherwise plan follow-up with Dr. Branden Armstrong 6 months. Patient seen while Dr. Eddie Meuller immediately available within office building.  _______________________  [address] This Encounter   Procedures    POCT EKG     _______________________  FOLLOW UP  Return in about 1 year (around 4/20/2022) for MUST BE LLD ONLY.     PCP: Amada López MD  Referring Physician: Jose Duran MD  [address] [place] [address], [address] [place] [address]        Electronically signed by JOSH Du at 04/20/2021 8:12 AM EDT

## 2021-05-05 NOTE — ANESTHESIA POSTPROCEDURE EVALUATION
Department of Anesthesiology  Postprocedure Note    Patient: Phil Borrero  MRN: [id number]  [place]: 1955  Date of evaluation: 5/5/2021  Time:  4:38 PM     Procedure Summary     Date: 05/05/21 Room / Location: [place] [address]    Anesthesia Start: 5370 Anesthesia Stop: 1509    Procedure: CYSTOSCOPY PROSTATIC URETHRAL LIFT (N/A Urethra) Diagnosis: (DX BPH)    Surgeons: Beth Israel MD Responsible Provider: Shantel Kelley MD    Anesthesia Type: general, MAC ASA Status: 3          Anesthesia Type: general, MAC    Navdeep Phase I: Navdeep Score: 5    Navdeep Phase II:      Last vitals: Reviewed and per EMR flowsheets.        Anesthesia Post Evaluation    Complications: no

## 2021-05-05 NOTE — ANESTHESIA PRE PROCEDURE
Department of Anesthesiology  Preprocedure Note       Name:  Darlene Forrest   Age:  63 y.o.  :  1955                                          MRN:  [id number]         Date:  2021      Surgeon: Rivas Castillo):  Kelly Rashid MD    Procedure: Procedure(s):  CYSTOSCOPY PROSTATIC URETHRAL LIFT    Medications prior to admission:   Prior to Admission medications    Medication Sig Start Date End Date Taking? Authorizing Provider   warfarin (COUMADIN) 6 MG tablet Take 9 mg by mouth three times a week Takes (1.5) 6 mg tablets = 9 mg  On Tuesday, Thursday, Saturday    Historical Provider, MD   metoprolol succinate (TOPROL XL) 100 MG extended release tablet Take 1 tablet by mouth daily 3/10/21   Historical Provider, MD   lisinopril (PRINIVIL;ZESTRIL) 40 MG tablet Take 20 mg by mouth 2 times daily Takes 1/2 of a 40 mg tablet 3/12/21   Historical Provider, MD   tamsulosin (FLOMAX) 0.4 MG capsule Take 2 capsules by mouth nightly 3/10/21   Historical Provider, MD   SPIRONOLACTONE PO Take 1 tablet by mouth daily    Historical Provider, MD   warfarin (COUMADIN) 6 MG tablet Take 6 mg by mouth four times a week On , Monday, Wednesday, Friday    Historical Provider, MD       Current medications:    No current facility-administered medications for this encounter.       Current Outpatient Medications   Medication Sig Dispense Refill    warfarin (COUMADIN) 6 MG tablet Take 9 mg by mouth three times a week Takes (1.5) 6 mg tablets = 9 mg  On Tuesday, Thursday, Saturday      metoprolol succinate (TOPROL XL) 100 MG extended release tablet Take 1 tablet by mouth daily      lisinopril (PRINIVIL;ZESTRIL) 40 MG tablet Take 20 mg by mouth 2 times daily Takes 1/2 of a 40 mg tablet      tamsulosin (FLOMAX) 0.4 MG capsule Take 2 capsules by mouth nightly      SPIRONOLACTONE PO Take 1 tablet by mouth daily      warfarin (COUMADIN) 6 MG tablet Take 6 mg by mouth four times a week On , Monday, Wednesday, Friday Allergies:  No Known Allergies    Problem List:  There is no problem list on file for this patient. Past Medical History:        Diagnosis Date    BPH (benign prostatic hyperplasia)     Hypertension     Mitral valve prolapse     TIA (transient ischemic attack)        Past Surgical History:        Procedure Laterality Date    CARDIAC SURGERY      CYSTOSCOPY N/A 3/30/2021    CYSTOSCOPY WITH UROFLOW performed by Huong Grey MD at [address]      minimally invasive procedure    PROSTATE BIOPSY      X2, found tiny spot of cancer on biopsies    VASECTOMY         Social History:    Social History     Tobacco Use    Smoking status: Current Some Day Smoker     Types: Cigars    Smokeless tobacco: Never Used   Substance Use Topics    Alcohol use: Yes     Comment: occasional beer                                Ready to quit: Not Answered  Counseling given: Not Answered      Vital Signs (Current): There were no vitals filed for this visit.                                            BP Readings from Last 3 Encounters:   03/30/21 (!) 164/94       NPO Status:                                                                                 BMI:   Wt Readings from Last 3 Encounters:   03/30/21 259 lb (117.5 kg)     There is no height or weight on file to calculate BMI.    CBC:   Lab Results   Component Value Date    WBC 7.9 06/29/2014    RBC 5.07 06/29/2014    HGB 14.4 06/29/2014    HCT 42.3 06/29/2014    MCV 83.5 06/29/2014    RDW 14.0 06/29/2014     06/29/2014       CMP:   Lab Results   Component Value Date     06/29/2014    K 3.3 06/29/2014     06/29/2014    CO2 23 06/29/2014    BUN 15 06/29/2014    CREATININE 0.75 06/29/2014    GFRAA >60 06/29/2014    LABGLOM >60 06/29/2014    GLUCOSE 117 06/29/2014    CALCIUM 8.5 06/29/2014       POC Tests: No results for input(s): POCGLU, POCNA, POCK, POCCL, POCBUN, POCHEMO, POCHCT in the last intended and Prophylactic antiemetics administered. Anesthetic plan and risks discussed with patient. Plan discussed with CRNA.                   Dom Garcia MD   5/5/2021

## 2022-07-06 ENCOUNTER — HOSPITAL ENCOUNTER (OUTPATIENT)
Age: 67
Setting detail: SPECIMEN
Discharge: HOME | End: 2022-07-06

## 2022-07-11 LAB
CULTURE: ABNORMAL
CULTURE: ABNORMAL
DIRECT EXAM: ABNORMAL
DIRECT EXAM: ABNORMAL
SPECIMEN DESCRIPTION: ABNORMAL

## 2022-09-29 ENCOUNTER — HOSPITAL ENCOUNTER (OUTPATIENT)
Age: 67
Discharge: HOME | End: 2022-09-29
Payer: MEDICARE

## 2022-09-29 ENCOUNTER — TRANSCRIBE ORDERS (OUTPATIENT)
Dept: ADMINISTRATIVE | Age: 67
End: 2022-09-29

## 2022-09-29 ENCOUNTER — TELEPHONE (OUTPATIENT)
Dept: ORTHOPEDIC SURGERY | Age: 67
End: 2022-09-29

## 2022-09-29 ENCOUNTER — HOSPITAL ENCOUNTER (OUTPATIENT)
Dept: GENERAL RADIOLOGY | Age: 67
Discharge: HOME | End: 2022-10-01
Payer: MEDICARE

## 2022-09-29 DIAGNOSIS — M79.604 RIGHT LEG PAIN: ICD-10-CM

## 2022-09-29 DIAGNOSIS — L81.9 DISCOLORATION OF SKIN OF LOWER LEG: Primary | ICD-10-CM

## 2022-09-29 DIAGNOSIS — M25.461 KNEE EFFUSION, RIGHT: ICD-10-CM

## 2022-09-29 DIAGNOSIS — M25.462 KNEE EFFUSION, LEFT: ICD-10-CM

## 2022-09-29 PROCEDURE — 73562 X-RAY EXAM OF KNEE 3: CPT

## 2022-10-07 ENCOUNTER — APPOINTMENT (OUTPATIENT)
Dept: VASCULAR LAB | Age: 67
End: 2022-10-07
Payer: MEDICARE

## 2022-10-07 ENCOUNTER — HOSPITAL ENCOUNTER (OUTPATIENT)
Dept: VASCULAR LAB | Age: 67
Discharge: HOME | End: 2022-10-07
Payer: MEDICARE

## 2022-10-07 DIAGNOSIS — M79.605 LEFT LEG PAIN: ICD-10-CM

## 2022-10-07 DIAGNOSIS — M25.462 EFFUSION OF LEFT KNEE: ICD-10-CM

## 2022-10-07 PROCEDURE — 93971 EXTREMITY STUDY: CPT

## 2022-10-07 PROCEDURE — 93926 LOWER EXTREMITY STUDY: CPT

## 2022-11-30 ENCOUNTER — OFFICE VISIT (OUTPATIENT)
Dept: ORTHOPEDIC SURGERY | Age: 67
End: 2022-11-30

## 2022-11-30 VITALS — WEIGHT: 264 LBS | BODY MASS INDEX: 34.99 KG/M2 | OXYGEN SATURATION: 100 % | HEIGHT: 73 IN | RESPIRATION RATE: 16 BRPM

## 2022-11-30 DIAGNOSIS — M70.42 PREPATELLAR BURSITIS OF LEFT KNEE: Primary | ICD-10-CM

## 2022-11-30 DIAGNOSIS — M25.561 RIGHT KNEE PAIN, UNSPECIFIED CHRONICITY: Primary | ICD-10-CM

## 2023-02-13 ENCOUNTER — HOSPITAL ENCOUNTER (OUTPATIENT)
Age: 68
Setting detail: SPECIMEN
Discharge: HOME | End: 2023-02-13

## 2023-02-13 LAB
25(OH)D3 SERPL-MCNC: 15.1 NG/ML
ABSOLUTE EOS #: 0.2 K/UL (ref 0–0.44)
ABSOLUTE IMMATURE GRANULOCYTE: <0.03 K/UL (ref 0–0.3)
ABSOLUTE LYMPH #: 2.11 K/UL (ref 1.1–3.7)
ABSOLUTE MONO #: 0.53 K/UL (ref 0.1–1.2)
ALBUMIN SERPL-MCNC: 4.1 G/DL (ref 3.5–5.2)
ALBUMIN/GLOBULIN RATIO: 1.5 (ref 1–2.5)
ALP SERPL-CCNC: 86 U/L (ref 40–129)
ALT SERPL-CCNC: 18 U/L (ref 5–41)
ANION GAP SERPL CALCULATED.3IONS-SCNC: 10 MMOL/L (ref 9–17)
AST SERPL-CCNC: 24 U/L
BASOPHILS # BLD: 1 % (ref 0–2)
BASOPHILS ABSOLUTE: 0.05 K/UL (ref 0–0.2)
BILIRUB SERPL-MCNC: 0.5 MG/DL (ref 0.3–1.2)
BUN SERPL-MCNC: 18 MG/DL (ref 8–23)
CALCIUM SERPL-MCNC: 8.8 MG/DL (ref 8.6–10.4)
CHLORIDE SERPL-SCNC: 106 MMOL/L (ref 98–107)
CO2 SERPL-SCNC: 23 MMOL/L (ref 20–31)
CREAT SERPL-MCNC: 0.99 MG/DL (ref 0.7–1.2)
EOSINOPHILS RELATIVE PERCENT: 3 % (ref 1–4)
GFR SERPL CREATININE-BSD FRML MDRD: >60 ML/MIN/1.73M2
GLUCOSE SERPL-MCNC: 133 MG/DL (ref 70–99)
HCT VFR BLD AUTO: 41.9 % (ref 40.7–50.3)
HGB BLD-MCNC: 13.9 G/DL (ref 13–17)
IMMATURE GRANULOCYTES: 0 %
LYMPHOCYTES # BLD: 33 % (ref 24–43)
MCH RBC QN AUTO: 28.7 PG (ref 25.2–33.5)
MCHC RBC AUTO-ENTMCNC: 33.2 G/DL (ref 28.4–34.8)
MCV RBC AUTO: 86.4 FL (ref 82.6–102.9)
MONOCYTES # BLD: 8 % (ref 3–12)
NRBC AUTOMATED: 0 PER 100 WBC
PDW BLD-RTO: 13 % (ref 11.8–14.4)
PLATELET # BLD AUTO: 223 K/UL (ref 138–453)
PMV BLD AUTO: 11.1 FL (ref 8.1–13.5)
POTASSIUM SERPL-SCNC: 3.9 MMOL/L (ref 3.7–5.3)
PROT SERPL-MCNC: 6.8 G/DL (ref 6.4–8.3)
RBC # BLD: 4.85 M/UL (ref 4.21–5.77)
SEG NEUTROPHILS: 55 % (ref 36–65)
SEGMENTED NEUTROPHILS ABSOLUTE COUNT: 3.45 K/UL (ref 1.5–8.1)
SODIUM SERPL-SCNC: 139 MMOL/L (ref 135–144)
TSH SERPL-ACNC: 3.35 UIU/ML (ref 0.3–5)
VIT B12 SERPL-MCNC: 322 PG/ML (ref 232–1245)
WBC # BLD AUTO: 6.4 K/UL (ref 3.5–11.3)

## 2023-03-03 ENCOUNTER — HOSPITAL ENCOUNTER (OUTPATIENT)
Age: 68
Setting detail: SPECIMEN
Discharge: HOME | End: 2023-03-03

## 2023-03-03 LAB
EST. AVERAGE GLUCOSE BLD GHB EST-MCNC: 131 MG/DL
HBA1C MFR BLD: 6.2 % (ref 4–6)

## 2023-03-31 ENCOUNTER — HOSPITAL ENCOUNTER (OUTPATIENT)
Dept: MRI IMAGING | Age: 68
End: 2023-03-31
Payer: MEDICARE

## 2023-03-31 DIAGNOSIS — R41.3 MEMORY CHANGES: ICD-10-CM

## 2023-03-31 LAB
CREAT SERPL-MCNC: 0.8 MG/DL (ref 0.7–1.2)
GFR SERPL CREATININE-BSD FRML MDRD: >60 ML/MIN/1.73M2

## 2023-03-31 PROCEDURE — 6360000004 HC RX CONTRAST MEDICATION: Performed by: FAMILY MEDICINE

## 2023-03-31 PROCEDURE — A9579 GAD-BASE MR CONTRAST NOS,1ML: HCPCS | Performed by: FAMILY MEDICINE

## 2023-03-31 PROCEDURE — 36415 COLL VENOUS BLD VENIPUNCTURE: CPT

## 2023-03-31 PROCEDURE — 82565 ASSAY OF CREATININE: CPT

## 2023-03-31 PROCEDURE — 70553 MRI BRAIN STEM W/O & W/DYE: CPT

## 2023-03-31 RX ADMIN — GADOTERIDOL 20 ML: 279.3 INJECTION, SOLUTION INTRAVENOUS at 12:54

## 2023-11-27 ENCOUNTER — HOSPITAL ENCOUNTER (OUTPATIENT)
Age: 68
Setting detail: SPECIMEN
Discharge: HOME | End: 2023-11-27

## 2023-11-27 LAB
CHOLEST SERPL-MCNC: 222 MG/DL
CHOLESTEROL/HDL RATIO: 4.5
EST. AVERAGE GLUCOSE BLD GHB EST-MCNC: 126 MG/DL
HBA1C MFR BLD: 6 % (ref 4–6)
HDLC SERPL-MCNC: 49 MG/DL
LDLC SERPL CALC-MCNC: 156 MG/DL (ref 0–130)
TRIGL SERPL-MCNC: 85 MG/DL

## 2024-07-09 ENCOUNTER — HOSPITAL ENCOUNTER (OUTPATIENT)
Age: 69
Setting detail: SPECIMEN
Discharge: HOME | End: 2024-07-09

## 2024-07-09 LAB
25(OH)D3 SERPL-MCNC: 26.7 NG/ML (ref 30–100)
ALBUMIN SERPL-MCNC: 4.5 G/DL (ref 3.5–5.2)
ALBUMIN/GLOB SERPL: 2 {RATIO} (ref 1–2.5)
ALP SERPL-CCNC: 83 U/L (ref 40–129)
ALT SERPL-CCNC: 21 U/L (ref 10–50)
ANION GAP SERPL CALCULATED.3IONS-SCNC: 14 MMOL/L (ref 9–16)
AST SERPL-CCNC: 28 U/L (ref 10–50)
BASOPHILS # BLD: 0.04 K/UL (ref 0–0.2)
BASOPHILS NFR BLD: 1 % (ref 0–2)
BILIRUB DIRECT SERPL-MCNC: 0.4 MG/DL (ref 0–0.2)
BILIRUB INDIRECT SERPL-MCNC: 0.5 MG/DL (ref 0–1)
BILIRUB SERPL-MCNC: 0.9 MG/DL (ref 0–1.2)
BUN SERPL-MCNC: 13 MG/DL (ref 8–23)
CALCIUM SERPL-MCNC: 8.7 MG/DL (ref 8.6–10.4)
CHLORIDE SERPL-SCNC: 107 MMOL/L (ref 98–107)
CO2 SERPL-SCNC: 23 MMOL/L (ref 20–31)
CREAT SERPL-MCNC: 1.2 MG/DL (ref 0.7–1.2)
EOSINOPHIL # BLD: 0.1 K/UL (ref 0–0.44)
EOSINOPHILS RELATIVE PERCENT: 2 % (ref 1–4)
ERYTHROCYTE [DISTWIDTH] IN BLOOD BY AUTOMATED COUNT: 13.9 % (ref 11.8–14.4)
GFR, ESTIMATED: 66 ML/MIN/1.73M2
GLUCOSE SERPL-MCNC: 102 MG/DL (ref 74–99)
HCT VFR BLD AUTO: 44.7 % (ref 40.7–50.3)
HGB BLD-MCNC: 14.3 G/DL (ref 13–17)
IMM GRANULOCYTES # BLD AUTO: <0.03 K/UL (ref 0–0.3)
IMM GRANULOCYTES NFR BLD: 0 %
LYMPHOCYTES NFR BLD: 1.2 K/UL (ref 1.1–3.7)
LYMPHOCYTES RELATIVE PERCENT: 29 % (ref 24–43)
MCH RBC QN AUTO: 28.7 PG (ref 25.2–33.5)
MCHC RBC AUTO-ENTMCNC: 32 G/DL (ref 28.4–34.8)
MCV RBC AUTO: 89.8 FL (ref 82.6–102.9)
MONOCYTES NFR BLD: 0.59 K/UL (ref 0.1–1.2)
MONOCYTES NFR BLD: 14 % (ref 3–12)
NEUTROPHILS NFR BLD: 54 % (ref 36–65)
NEUTS SEG NFR BLD: 2.17 K/UL (ref 1.5–8.1)
NRBC BLD-RTO: 0 PER 100 WBC
PLATELET # BLD AUTO: 193 K/UL (ref 138–453)
PMV BLD AUTO: 11.1 FL (ref 8.1–13.5)
POTASSIUM SERPL-SCNC: 3.6 MMOL/L (ref 3.7–5.3)
PROT SERPL-MCNC: 7.1 G/DL (ref 6.6–8.7)
RBC # BLD AUTO: 4.98 M/UL (ref 4.21–5.77)
SODIUM SERPL-SCNC: 144 MMOL/L (ref 136–145)
TSH SERPL DL<=0.05 MIU/L-ACNC: 3.6 UIU/ML (ref 0.27–4.2)
WBC OTHER # BLD: 4.1 K/UL (ref 3.5–11.3)

## 2024-09-11 ENCOUNTER — HOSPITAL ENCOUNTER (OUTPATIENT)
Age: 69
Setting detail: SPECIMEN
Discharge: HOME | End: 2024-09-11

## 2024-09-11 LAB
25(OH)D3 SERPL-MCNC: 26.5 NG/ML (ref 30–100)
ALBUMIN SERPL-MCNC: 4.3 G/DL (ref 3.5–5.2)
ALBUMIN/GLOB SERPL: 1 {RATIO} (ref 1–2.5)
ALP SERPL-CCNC: 77 U/L (ref 40–129)
ALT SERPL-CCNC: 13 U/L (ref 10–50)
ANION GAP SERPL CALCULATED.3IONS-SCNC: 12 MMOL/L (ref 9–16)
AST SERPL-CCNC: 22 U/L (ref 10–50)
BASOPHILS # BLD: 0.05 K/UL (ref 0–0.2)
BASOPHILS NFR BLD: 1 % (ref 0–2)
BILIRUB DIRECT SERPL-MCNC: 0.2 MG/DL (ref 0–0.2)
BILIRUB INDIRECT SERPL-MCNC: 0.3 MG/DL (ref 0–1)
BILIRUB SERPL-MCNC: 0.5 MG/DL (ref 0–1.2)
BUN SERPL-MCNC: 20 MG/DL (ref 8–23)
CALCIUM SERPL-MCNC: 8.6 MG/DL (ref 8.6–10.4)
CHLORIDE SERPL-SCNC: 107 MMOL/L (ref 98–107)
CO2 SERPL-SCNC: 25 MMOL/L (ref 20–31)
CREAT SERPL-MCNC: 1.2 MG/DL (ref 0.7–1.2)
EOSINOPHIL # BLD: 0.26 K/UL (ref 0–0.44)
EOSINOPHILS RELATIVE PERCENT: 4 % (ref 1–4)
ERYTHROCYTE [DISTWIDTH] IN BLOOD BY AUTOMATED COUNT: 12.9 % (ref 11.8–14.4)
GFR, ESTIMATED: 68 ML/MIN/1.73M2
GLUCOSE SERPL-MCNC: 153 MG/DL (ref 74–99)
HCT VFR BLD AUTO: 45.7 % (ref 40.7–50.3)
HGB BLD-MCNC: 14.7 G/DL (ref 13–17)
IMM GRANULOCYTES # BLD AUTO: <0.03 K/UL (ref 0–0.3)
IMM GRANULOCYTES NFR BLD: 0 %
LYMPHOCYTES NFR BLD: 2.29 K/UL (ref 1.1–3.7)
LYMPHOCYTES RELATIVE PERCENT: 35 % (ref 24–43)
MCH RBC QN AUTO: 28.6 PG (ref 25.2–33.5)
MCHC RBC AUTO-ENTMCNC: 32.2 G/DL (ref 28.4–34.8)
MCV RBC AUTO: 88.9 FL (ref 82.6–102.9)
MONOCYTES NFR BLD: 0.63 K/UL (ref 0.1–1.2)
MONOCYTES NFR BLD: 10 % (ref 3–12)
NEUTROPHILS NFR BLD: 50 % (ref 36–65)
NEUTS SEG NFR BLD: 3.25 K/UL (ref 1.5–8.1)
NRBC BLD-RTO: 0 PER 100 WBC
PLATELET # BLD AUTO: 258 K/UL (ref 138–453)
PMV BLD AUTO: 11 FL (ref 8.1–13.5)
POTASSIUM SERPL-SCNC: 3.6 MMOL/L (ref 3.7–5.3)
PROT SERPL-MCNC: 7.2 G/DL (ref 6.6–8.7)
RBC # BLD AUTO: 5.14 M/UL (ref 4.21–5.77)
SODIUM SERPL-SCNC: 144 MMOL/L (ref 136–145)
WBC OTHER # BLD: 6.5 K/UL (ref 3.5–11.3)

## 2024-09-24 ENCOUNTER — HOSPITAL ENCOUNTER (OUTPATIENT)
Age: 69
Setting detail: SPECIMEN
Discharge: HOME | End: 2024-09-24

## 2024-09-24 LAB
ANION GAP SERPL CALCULATED.3IONS-SCNC: 8 MMOL/L (ref 9–16)
BUN SERPL-MCNC: 15 MG/DL (ref 8–23)
CALCIUM SERPL-MCNC: 8.5 MG/DL (ref 8.6–10.4)
CHLORIDE SERPL-SCNC: 110 MMOL/L (ref 98–107)
CO2 SERPL-SCNC: 25 MMOL/L (ref 20–31)
CREAT SERPL-MCNC: 1.1 MG/DL (ref 0.7–1.2)
EST. AVERAGE GLUCOSE BLD GHB EST-MCNC: 126 MG/DL
GFR, ESTIMATED: 70 ML/MIN/1.73M2
GLUCOSE SERPL-MCNC: 124 MG/DL (ref 74–99)
HBA1C MFR BLD: 6 % (ref 4–6)
MAGNESIUM SERPL-MCNC: 2.1 MG/DL (ref 1.6–2.4)
POTASSIUM SERPL-SCNC: 4.3 MMOL/L (ref 3.7–5.3)
SODIUM SERPL-SCNC: 143 MMOL/L (ref 136–145)

## 2024-12-06 ENCOUNTER — HOSPITAL ENCOUNTER (OUTPATIENT)
Age: 69
Setting detail: SPECIMEN
Discharge: HOME | End: 2024-12-06

## 2024-12-06 LAB
ALBUMIN SERPL-MCNC: 4.3 G/DL (ref 3.5–5.2)
ALBUMIN/GLOB SERPL: 1.7 {RATIO} (ref 1–2.5)
ALP SERPL-CCNC: 80 U/L (ref 40–129)
ALT SERPL-CCNC: 24 U/L (ref 10–50)
ANION GAP SERPL CALCULATED.3IONS-SCNC: 12 MMOL/L (ref 9–16)
AST SERPL-CCNC: 33 U/L (ref 10–50)
BILIRUB SERPL-MCNC: 1.2 MG/DL (ref 0–1.2)
BUN SERPL-MCNC: 17 MG/DL (ref 8–23)
CALCIUM SERPL-MCNC: 8.5 MG/DL (ref 8.6–10.4)
CHLORIDE SERPL-SCNC: 103 MMOL/L (ref 98–107)
CHOLEST SERPL-MCNC: 139 MG/DL (ref 0–199)
CHOLESTEROL/HDL RATIO: 2.9
CO2 SERPL-SCNC: 24 MMOL/L (ref 20–31)
CREAT SERPL-MCNC: 0.9 MG/DL (ref 0.7–1.2)
GFR, ESTIMATED: >90 ML/MIN/1.73M2
GLUCOSE P FAST SERPL-MCNC: 93 MG/DL (ref 74–99)
HDLC SERPL-MCNC: 48 MG/DL
LDLC SERPL CALC-MCNC: 75 MG/DL (ref 0–100)
MAGNESIUM SERPL-MCNC: 2.1 MG/DL (ref 1.6–2.4)
POTASSIUM SERPL-SCNC: 4.2 MMOL/L (ref 3.7–5.3)
PROT SERPL-MCNC: 6.8 G/DL (ref 6.6–8.7)
SODIUM SERPL-SCNC: 139 MMOL/L (ref 136–145)
TRIGL SERPL-MCNC: 81 MG/DL (ref 0–149)
TSH SERPL DL<=0.05 MIU/L-ACNC: 1.56 UIU/ML (ref 0.27–4.2)
VLDLC SERPL CALC-MCNC: 16 MG/DL (ref 1–30)

## 2025-05-15 ENCOUNTER — HOSPITAL ENCOUNTER (OUTPATIENT)
Age: 70
Setting detail: SPECIMEN
Discharge: HOME | End: 2025-05-15

## 2025-05-15 LAB
ANION GAP SERPL CALCULATED.3IONS-SCNC: 10 MMOL/L (ref 9–16)
BUN SERPL-MCNC: 18 MG/DL (ref 8–23)
CALCIUM SERPL-MCNC: 9.4 MG/DL (ref 8.6–10.4)
CHLORIDE SERPL-SCNC: 105 MMOL/L (ref 98–107)
CO2 SERPL-SCNC: 25 MMOL/L (ref 20–31)
CREAT SERPL-MCNC: 1 MG/DL (ref 0.7–1.2)
ERYTHROCYTE [DISTWIDTH] IN BLOOD BY AUTOMATED COUNT: 14.1 % (ref 11.8–14.4)
EST. AVERAGE GLUCOSE BLD GHB EST-MCNC: 126 MG/DL
GFR, ESTIMATED: 82 ML/MIN/1.73M2
GLUCOSE SERPL-MCNC: 96 MG/DL (ref 74–99)
HBA1C MFR BLD: 6 % (ref 4–6)
HCT VFR BLD AUTO: 46 % (ref 40.7–50.3)
HGB BLD-MCNC: 14.9 G/DL (ref 13–17)
MAGNESIUM SERPL-MCNC: 2.3 MG/DL (ref 1.6–2.4)
MCH RBC QN AUTO: 28.7 PG (ref 25.2–33.5)
MCHC RBC AUTO-ENTMCNC: 32.4 G/DL (ref 28.4–34.8)
MCV RBC AUTO: 88.5 FL (ref 82.6–102.9)
NRBC BLD-RTO: 0 PER 100 WBC
PLATELET # BLD AUTO: 221 K/UL (ref 138–453)
PMV BLD AUTO: 11 FL (ref 8.1–13.5)
POTASSIUM SERPL-SCNC: 4.9 MMOL/L (ref 3.7–5.3)
RBC # BLD AUTO: 5.2 M/UL (ref 4.21–5.77)
SODIUM SERPL-SCNC: 140 MMOL/L (ref 136–145)
WBC OTHER # BLD: 6.1 K/UL (ref 3.5–11.3)

## (undated) DEVICE — CATHETER URETH 18FR BLLN 5CC SIL ALLY W/ SIL HYDRGEL 2 W F

## (undated) DEVICE — Z DUP USE 2522782 SOLUTION IRRIG 1000ML STRL H2O PLAS CONTAINER UROMATIC

## (undated) DEVICE — GOWN,AURORA,NONREINFORCED,LARGE: Brand: MEDLINE

## (undated) DEVICE — ERASECAUTI INTERMIT TRAY: Brand: MEDLINE INDUSTRIES, INC.

## (undated) DEVICE — DALE FOLEY CATHETER HOLDER, LEGBAND, FITS UP TO 30": Brand: DALE FOLEY CATHETER HOLDER

## (undated) DEVICE — Device

## (undated) DEVICE — TOWEL,OR,DSP,ST,BLUE,DLX,XR,4/PK,20PK/CS: Brand: MEDLINE

## (undated) DEVICE — STERILE POLYISOPRENE POWDER-FREE SURGICAL GLOVES: Brand: PROTEXIS

## (undated) DEVICE — DRAINBAG,ANTI-REFLUX TOWER,L/F,2000ML,LL: Brand: MEDLINE

## (undated) DEVICE — Z INACTIVE USE 2660664 SOLUTION IRRIG 3000ML 0.9% SOD CHL USP UROMATIC PLAS CONT

## (undated) DEVICE — GLOVE SURG SZ 65 CRM LTX FREE POLYISOPRENE POLYMER BEAD ANTI